# Patient Record
Sex: MALE | Race: WHITE | Employment: FULL TIME | URBAN - METROPOLITAN AREA
[De-identification: names, ages, dates, MRNs, and addresses within clinical notes are randomized per-mention and may not be internally consistent; named-entity substitution may affect disease eponyms.]

---

## 2017-12-10 DIAGNOSIS — F41.1 GENERALIZED ANXIETY DISORDER: ICD-10-CM

## 2017-12-10 DIAGNOSIS — H93.13 TINNITUS, BILATERAL: ICD-10-CM

## 2017-12-11 RX ORDER — SERTRALINE HYDROCHLORIDE 100 MG/1
TABLET, FILM COATED ORAL
Qty: 30 TABLET | Refills: 0 | Status: SHIPPED | OUTPATIENT
Start: 2017-12-11 | End: 2018-01-10

## 2017-12-11 NOTE — TELEPHONE ENCOUNTER
JCC, the patient is due for an office visit. I have changed the quantity to #30 and put in a note that the patient is due for an ov.    Pending Prescriptions:                       Disp   Refills    sertraline (ZOLOFT) 100 MG tablet [Pharma*30 tab*0            Sig: TAKE 1 TABLET (100 MG) BY MOUTH DAILY      Ready to be faxed when Rx is approved.    Please forward to the  so the can call the patient and help them set up an appointment.      Telephone Information:   Mobile [phone]         Thank-You,  Nila

## 2017-12-11 NOTE — TELEPHONE ENCOUNTER
Please let the patient know that a 30 day refill has been sent for their prescription.  They are due for a return non-fasting office visit within 30 days.  Failure to schedule an appointment may result in no further refills of his/her medication.

## 2018-01-10 ENCOUNTER — OFFICE VISIT (OUTPATIENT)
Dept: FAMILY MEDICINE | Facility: CLINIC | Age: 48
End: 2018-01-10

## 2018-01-10 VITALS
DIASTOLIC BLOOD PRESSURE: 86 MMHG | BODY MASS INDEX: 29.25 KG/M2 | WEIGHT: 202.4 LBS | SYSTOLIC BLOOD PRESSURE: 124 MMHG | TEMPERATURE: 97.7 F | OXYGEN SATURATION: 97 % | HEART RATE: 77 BPM

## 2018-01-10 DIAGNOSIS — F41.1 GAD (GENERALIZED ANXIETY DISORDER): Primary | ICD-10-CM

## 2018-01-10 DIAGNOSIS — H93.13 TINNITUS, BILATERAL: ICD-10-CM

## 2018-01-10 PROCEDURE — 99213 OFFICE O/P EST LOW 20 MIN: CPT | Performed by: FAMILY MEDICINE

## 2018-01-10 RX ORDER — SERTRALINE HYDROCHLORIDE 100 MG/1
100 TABLET, FILM COATED ORAL DAILY
Qty: 90 TABLET | Refills: 3 | Status: SHIPPED | OUTPATIENT
Start: 2018-01-10 | End: 2019-01-17

## 2018-01-10 NOTE — MR AVS SNAPSHOT
"              After Visit Summary   1/10/2018    George Nixon    MRN: [id number]           Patient Information     Date Of Birth          1970        Visit Information        Provider Department      1/10/2018 5:00 PM Zachary Nolasco MD [facility]        Today's Diagnoses     FLOYD (generalized anxiety disorder)    -  1    Tinnitus, bilateral           Follow-ups after your visit        Follow-up notes from your care team     Return in about 1 year (around 1/10/2019).      Who to contact     If you have questions or need follow up information about today's clinic visit or your schedule please contact [facility] directly at [phone].  Normal or non-critical lab and imaging results will be communicated to you by MyChart, letter or phone within 4 business days after the clinic has received the results. If you do not hear from us within 7 days, please contact the clinic through [employer] or phone. If you have a critical or abnormal lab result, we will notify you by phone as soon as possible.  Submit refill requests through [employer] or call your pharmacy and they will forward the refill request to us. Please allow 3 business days for your refill to be completed.          Additional Information About Your Visit        MyChart Information     [facility] lets you send messages to your doctor, view your test results, renew your prescriptions, schedule appointments and more. To sign up, go to [email or web address] . Click on \"Log in\" on the left side of the screen, which will take you to the Welcome page. Then click on \"Sign up Now\" on the right side of the page.     You will be asked to enter the access code listed below, as well as some personal information. Please follow the directions to create your username and password.     Your access code is: [email or web address]  Expires: 4/10/2018  5:13 PM     Your access code will  in 90 days. If you need help or a new " code, please call your [facility] clinic or [phone].        Care EveryWhere ID     This is your Care EveryWhere ID. This could be used by other organizations to access your [facility] medical records  [id number]        Your Vitals Were     Pulse Temperature Pulse Oximetry BMI (Body Mass Index)          77 97.7  F (36.5  C) (Oral) 97% 29.25 kg/m2         Blood Pressure from Last 3 Encounters:   01/10/18 124/86   11/21/16 110/72   04/11/16 120/76    Weight from Last 3 Encounters:   01/10/18 91.8 kg (202 lb 6.4 oz)   11/21/16 92.1 kg (203 lb)   04/11/16 86.2 kg (190 lb)              Today, you had the following     No orders found for display         Today's Medication Changes          These changes are accurate as of: 1/10/18  5:13 PM.  If you have any questions, ask your nurse or doctor.               These medicines have changed or have updated prescriptions.        Dose/Directions    sertraline 100 MG tablet   Commonly known as:  ZOLOFT   This may have changed:  See the new instructions.   Used for:  Tinnitus, bilateral, FLOYD (generalized anxiety disorder)   Changed by:  Zachary Nolasco MD        Dose:  100 mg   Take 1 tablet (100 mg) by mouth daily   Quantity:  90 tablet   Refills:  3            Where to get your medicines      These medications were sent to [facility]/pharmacy #[id number] [address] [address]     Phone:  [phone]     sertraline 100 MG tablet                Primary Care Provider Office Phone # Fax #    Zachary Nolasco [phone] [address] [address]        Equal Access to Services     [facility] : Hadii ilia rosas hadnayo Soenma, waaxda luqadaha, qaybta kaalmada robert, roney patel. So [facility] [phone].    ATENCIÓN: Si habla español, tiene a lee disposición servicios gratuitos de asistencia lingüística. Llame al [phone].    We comply  with applicable federal civil rights laws and [place] laws. We do not discriminate on the basis of race, color, national origin, age, disability, sex, sexual orientation, or gender identity.            Thank you!     Thank you for choosing [facility]  for your care. Our goal is always to provide you with excellent care. Hearing back from our patients is one way we can continue to improve our services. Please take a few minutes to complete the written survey that you may receive in the mail after your visit with us. Thank you!             Your Updated Medication List - Protect others around you: Learn how to safely use, store and throw away your medicines at www.disposemymeds.org.          This list is accurate as of: 1/10/18  5:13 PM.  Always use your most recent med list.                   Brand Name Dispense Instructions for use Diagnosis    LORazepam 0.5 MG tablet    ATIVAN    30 tablet    Take 1 tablet (0.5 mg) by mouth every 8 hours as needed for anxiety    Tinnitus, bilateral, Hyperacusis, unspecified laterality, Generalized anxiety disorder       sertraline 100 MG tablet    ZOLOFT    90 tablet    Take 1 tablet (100 mg) by mouth daily    Tinnitus, bilateral, FLOYD (generalized anxiety disorder)

## 2018-01-10 NOTE — NURSING NOTE
George is here for med check- medication is for ringing in ears not depression or anxiety    Pre-visit Screening:  Immunizations:  up to date  Colonoscopy:  NA  Mammogram: NA  Asthma Action Test/Plan:  NA  PHQ9:  NA  GAD7:  NA  Questioned patient about current smoking habits Pt. has never smoked.  Ok to leave detailed message on voice mail for today's visit only Yes, phone # [phone]

## 2018-01-10 NOTE — PROGRESS NOTES
SUBJECTIVE:                                                    George Nixon is a 47 year old male who presents to clinic today for the following health issues:      Anxiety Follow-Up    Status since last visit: No change    Other associated symptoms:None    Complicating factors:   Significant life event: No   Current substance abuse: None  Depression symptoms: No  FLOYD-7 SCORE 1/2/2014 4/13/2015 3/29/2016   Total Score 0 1 -   Total Score - - 10       GAD7              Amount of exercise or physical activity: 1 day/week for an average of 15-30 minutes    Problems taking medications regularly: No    Medication side effects: none    Diet: regular (no restrictions)        Tinnitus-still hears all the time but it waxes and wanes    Problem list and histories reviewed & adjusted, as indicated.  Additional history: as documented    Patient Active Problem List   Diagnosis     Generalized anxiety disorder     Increased bilirubin level     Mixed hyperlipidemia     Abnormal glucose     Hyperacusis     Health Care Home     Tinnitus     ACP (advance care planning)     FLOYD (generalized anxiety disorder)     Past Surgical History:   Procedure Laterality Date     C AFF ANUS PROCEDURE UNLISTED  2006    post anal cyst, dr Sainz     HC REPAIR OF NASAL SEPTUM  2001       Social History   Substance Use Topics     Smoking status: Never Smoker     Smokeless tobacco: Former User     Alcohol use 3.0 oz/week      Comment: occ      Family History   Problem Relation Age of Onset     GASTROINTESTINAL DISEASE Father      Hypertension Mother      Hypertension Sister      C.A.D. Father 45     C.A.D. Paternal Uncle          Current Outpatient Prescriptions   Medication Sig Dispense Refill     sertraline (ZOLOFT) 100 MG tablet TAKE 1 TABLET (100 MG) BY MOUTH DAILY 30 tablet 0     LORazepam (ATIVAN) 0.5 MG tablet Take 1 tablet (0.5 mg) by mouth every 8 hours as needed for anxiety 30 tablet 0     Allergies   Allergen Reactions     No Known  Allergies      Recent Labs   Lab Test  06/28/11   0500  06/28/11   0400 11/05/10   A1C   --    --   5.8   LDL   --    --   145*   HDL   --    --   47   TRIG   --    --   124   ALT   --    --   17   CR  0.77*   --   0.90   GFRESTIMATED  112   --   >59   GFRESTBLACK   --    --   >59   POTASSIUM  4.3   --   4.8   TSH   --   0.90   --       BP Readings from Last 3 Encounters:   01/10/18 124/86   11/21/16 110/72   04/11/16 120/76    Wt Readings from Last 3 Encounters:   01/10/18 91.8 kg (202 lb 6.4 oz)   11/21/16 92.1 kg (203 lb)   04/11/16 86.2 kg (190 lb)                          ROS:  Constitutional, HEENT, cardiovascular, pulmonary, gi and gu systems are negative, except as otherwise noted.      OBJECTIVE:   /86 (BP Location: Right arm, Patient Position: Chair, Cuff Size: Adult Large)  Pulse 77  Temp 97.7  F (36.5  C) (Oral)  Wt 91.8 kg (202 lb 6.4 oz)  SpO2 97%  BMI 29.25 kg/m2  Body mass index is 29.25 kg/(m^2).   GENERAL: healthy, alert and no distress  NECK: no adenopathy, no asymmetry, masses, or scars and thyroid normal to palpation  RESP: lungs clear to auscultation - no rales, rhonchi or wheezes  CV: regular rate and rhythm, normal S1 S2, no S3 or S4, no murmur, click or rub, no peripheral edema and peripheral pulses strong  ABDOMEN: soft, nontender, no hepatosplenomegaly, no masses and bowel sounds normal  MS: no gross musculoskeletal defects noted, no edema    Diagnostic Test Results:  none     ASSESSMENT:       PLAN:   (F41.1) FLOYD (generalized anxiety disorder)  (primary encounter diagnosis)  Comment: stable control-does not want to wean as that made tinnitus worse  Plan: sertraline (ZOLOFT) 100 MG tablet        continue current medications at current doses     (H93.13) Tinnitus, bilateral  Comment: stable  Plan: sertraline (ZOLOFT) 100 MG tablet        continue current medications at current doses       BMI:   Estimated body mass index is 29.25 kg/(m^2) as calculated from the following:     "Height as of 11/21/16: 1.772 m (5' 9.75\").    Weight as of this encounter: 91.8 kg (202 lb 6.4 oz).   Weight management plan: Discussed healthy diet and exercise guidelines and patient will follow up in 12 months in clinic to re-evaluate.        FUTURE APPOINTMENTS:       - Follow-up visit in 1 yr  Work on weight loss  Regular exercise    Zachary Nolasco MD  [facility]  "

## 2019-01-16 DIAGNOSIS — H93.13 TINNITUS, BILATERAL: ICD-10-CM

## 2019-01-16 DIAGNOSIS — F41.1 GAD (GENERALIZED ANXIETY DISORDER): ICD-10-CM

## 2019-01-16 RX ORDER — SERTRALINE HYDROCHLORIDE 100 MG/1
100 TABLET, FILM COATED ORAL DAILY
Qty: 90 TABLET | Refills: 1 | OUTPATIENT
Start: 2019-01-16

## 2019-01-16 NOTE — TELEPHONE ENCOUNTER
Refused Prescriptions:                       Disp   Refills    sertraline (ZOLOFT) 100 MG tablet [Pharmac*90 tab*1        Sig: TAKE 1 TABLET (100 MG) BY MOUTH DAILY  Refused By: KAYLA ERNST  Reason for Refusal: Request already responded to by other means (phone, fax, etc.)  Reason for Refusal Comment: refilled 1- for one year    Kayla  [phone] (home) [phone] (work)

## 2019-01-17 ENCOUNTER — TELEPHONE (OUTPATIENT)
Dept: FAMILY MEDICINE | Facility: CLINIC | Age: 49
End: 2019-01-17

## 2019-01-17 DIAGNOSIS — H93.13 TINNITUS, BILATERAL: ICD-10-CM

## 2019-01-17 DIAGNOSIS — F41.1 GAD (GENERALIZED ANXIETY DISORDER): ICD-10-CM

## 2019-01-17 RX ORDER — SERTRALINE HYDROCHLORIDE 100 MG/1
100 TABLET, FILM COATED ORAL DAILY
Qty: 90 TABLET | Refills: 1 | OUTPATIENT
Start: 2019-01-17

## 2019-01-17 RX ORDER — SERTRALINE HYDROCHLORIDE 100 MG/1
100 TABLET, FILM COATED ORAL DAILY
Qty: 6 TABLET | Refills: 0 | COMMUNITY
Start: 2019-01-17 | End: 2019-01-23

## 2019-01-17 NOTE — TELEPHONE ENCOUNTER
Refused Prescriptions:                       Disp   Refills    sertraline (ZOLOFT) 100 MG tablet [Pharmac*90 tab*1        Sig: TAKE 1 TABLET (100 MG) BY MOUTH DAILY  Refused By: KAYLA ERNST  Reason for Refusal: Duplicate  Reason for Refusal Comment: refilled 1- for one year    Kayla  [phone] (home) [phone] (work)

## 2019-01-17 NOTE — TELEPHONE ENCOUNTER
Pt made an appointment for 1/23. Called in 6 tablets with 0 refills to get him through until then.

## 2019-01-23 ENCOUNTER — OFFICE VISIT (OUTPATIENT)
Dept: FAMILY MEDICINE | Facility: CLINIC | Age: 49
End: 2019-01-23

## 2019-01-23 VITALS
HEART RATE: 69 BPM | WEIGHT: 209.4 LBS | DIASTOLIC BLOOD PRESSURE: 68 MMHG | HEIGHT: 70 IN | OXYGEN SATURATION: 94 % | TEMPERATURE: 97.6 F | BODY MASS INDEX: 29.98 KG/M2 | SYSTOLIC BLOOD PRESSURE: 110 MMHG

## 2019-01-23 DIAGNOSIS — F41.1 GAD (GENERALIZED ANXIETY DISORDER): ICD-10-CM

## 2019-01-23 DIAGNOSIS — H93.13 TINNITUS, BILATERAL: ICD-10-CM

## 2019-01-23 PROCEDURE — 99213 OFFICE O/P EST LOW 20 MIN: CPT | Performed by: FAMILY MEDICINE

## 2019-01-23 RX ORDER — SERTRALINE HYDROCHLORIDE 100 MG/1
100 TABLET, FILM COATED ORAL DAILY
Qty: 90 TABLET | Refills: 3 | Status: SHIPPED | OUTPATIENT
Start: 2019-01-23 | End: 2020-02-11

## 2019-01-23 ASSESSMENT — MIFFLIN-ST. JEOR: SCORE: 1817.11

## 2019-01-23 NOTE — PROGRESS NOTES
SUBJECTIVE:                                                    George Nixon is a 48 year old male who presents to clinic today for the following health issues:      Anxiety Pcvxod-Mv-kjqjxt related to chronic tinnitus    Status since last visit: No change    Other associated symptoms:None    Complicating factors:   Significant life event: No   Current substance abuse: None  Depression symptoms: No  FLOYD-7 SCORE 1/2/2014 4/13/2015 3/29/2016   Total Score 0 1 -   Total Score - - 10       FLOYD-7    Amount of exercise or physical activity: None    Problems taking medications regularly: No    Medication side effects: none    Diet: regular (no restrictions)            Problem list and histories reviewed & adjusted, as indicated.  Additional history: as documented    Patient Active Problem List   Diagnosis     Generalized anxiety disorder     Increased bilirubin level     Mixed hyperlipidemia     Abnormal glucose     Hyperacusis     Health Care Home     Tinnitus     ACP (advance care planning)     FLOYD (generalized anxiety disorder)     Past Surgical History:   Procedure Laterality Date     C AFF ANUS PROCEDURE UNLISTED  2006    post anal cyst, dr Sainz     HC REPAIR OF NASAL SEPTUM  2001       Social History     Tobacco Use     Smoking status: Never Smoker     Smokeless tobacco: Former User   Substance Use Topics     Alcohol use: Yes     Alcohol/week: 3.0 oz     Comment: occ      Family History   Problem Relation Age of Onset     Gastrointestinal Disease Father      Hypertension Mother      Hypertension Sister      C.A.D. Father 45     C.A.D. Paternal Uncle          Current Outpatient Medications   Medication Sig Dispense Refill     sertraline (ZOLOFT) 100 MG tablet Take 1 tablet (100 mg) by mouth daily 6 tablet 0     LORazepam (ATIVAN) 0.5 MG tablet Take 1 tablet (0.5 mg) by mouth every 8 hours as needed for anxiety 30 tablet 0     Allergies   Allergen Reactions     No Known Allergies      Recent Labs   Lab Test  "06/28/11  0500 06/28/11  0400   CR 0.77*  --    GFRESTIMATED 112  --    POTASSIUM 4.3  --    TSH  --  0.90      BP Readings from Last 3 Encounters:   01/23/19 110/68   01/10/18 124/86   11/21/16 110/72    Wt Readings from Last 3 Encounters:   01/23/19 95 kg (209 lb 6.4 oz)   01/10/18 91.8 kg (202 lb 6.4 oz)   11/21/16 92.1 kg (203 lb)                    ROS:  Constitutional, HEENT, cardiovascular, pulmonary, gi and gu systems are negative, except as otherwise noted.    OBJECTIVE:     /68 (BP Location: Right arm, Patient Position: Sitting, Cuff Size: Adult Large)   Pulse 69   Temp 97.6  F (36.4  C) (Oral)   Ht 1.772 m (5' 9.75\")   Wt 95 kg (209 lb 6.4 oz)   SpO2 94%   BMI 30.26 kg/m    Body mass index is 30.26 kg/m .   GENERAL: healthy, alert and no distress  EYES: Eyes grossly normal to inspection, PERRL and conjunctivae and sclerae normal  HENT: ear canals and TM's normal, nose and mouth without ulcers or lesions  NECK: no adenopathy, no asymmetry, masses, or scars and thyroid normal to palpation  RESP: lungs clear to auscultation - no rales, rhonchi or wheezes  CV: regular rate and rhythm, normal S1 S2, no S3 or S4, no murmur, click or rub, no peripheral edema and peripheral pulses strong  ABDOMEN: soft, nontender, no hepatosplenomegaly, no masses and bowel sounds normal  MS: no gross musculoskeletal defects noted, no edema  PSYCH: mentation appears normal, affect normal/bright    Diagnostic Test Results:  none     ASSESSMENT:       PLAN:   (H93.13) Tinnitus, bilateral  Comment: stable  Plan: sertraline (ZOLOFT) 100 MG tablet        continue current medications at current doses     (F41.1) FLOYD (generalized anxiety disorder)  Comment: well controlled  Plan: sertraline (ZOLOFT) 100 MG tablet        continue current medications at current doses       BMI:   Estimated body mass index is 30.26 kg/m  as calculated from the following:    Height as of this encounter: 1.772 m (5' 9.75\").    Weight as of this " encounter: 95 kg (209 lb 6.4 oz).   Weight management plan: Discussed healthy diet and exercise guidelines      FUTURE APPOINTMENTS:       - Follow-up visit in 1 yr  Work on weight loss  Regular exercise    Zachary Nolasco MD  [facility]

## 2019-01-23 NOTE — NURSING NOTE
George is here for med check (Yearly)    Pre-visit Screening:  Immunizations:  up to date declines flu shot  Colonoscopy:  NA  Mammogram: NA  Asthma Action Test/Plan:  NA  PHQ9:  Done today  GAD7:  Done today  Questioned patient about current smoking habits Pt. has never smoked.  Ok to leave detailed message on voice mail for today's visit only Yes, phone # [phone]

## 2019-11-12 RX ORDER — ALBUTEROL SULFATE 90 UG/1
AEROSOL, METERED RESPIRATORY (INHALATION)
Qty: 18 INHALER | Refills: 0 | OUTPATIENT
Start: 2019-11-12

## 2019-11-12 NOTE — TELEPHONE ENCOUNTER
Refused Prescriptions:                       Disp   Refills    VENTOLIN  (90 Base) MCG/ACT inhaler*18 Inh*0        Si-2 INHALATIONS EVERY 4-6 HOURS AS NEEDED FOR           WHEEZING. DISPENSE SPACER AS NEEDED.  Refused By: KAYLA ERNST  Reason for Refusal: OTHER    Not in pt chart  Last ov was  rtc in one year  No future appt's   Kayla  [phone] (home) [phone] (work)

## 2020-01-29 DIAGNOSIS — H93.13 TINNITUS, BILATERAL: ICD-10-CM

## 2020-01-29 DIAGNOSIS — F41.1 GAD (GENERALIZED ANXIETY DISORDER): ICD-10-CM

## 2020-01-29 RX ORDER — SERTRALINE HYDROCHLORIDE 100 MG/1
TABLET, FILM COATED ORAL
Qty: 90 TABLET | Refills: 3 | COMMUNITY
Start: 2020-01-29

## 2020-01-29 NOTE — TELEPHONE ENCOUNTER
George Nixon is requesting a refill of:    Refused Prescriptions:                       Disp   Refills    sertraline (ZOLOFT) 100 MG tablet [Pharmac*90 tab*3        Sig: TAKE 1 TABLET BY MOUTH EVERY DAY  Refused By: VEE TREJO  Reason for Refusal: Patient needs appointment    Pt needs non fasting OV

## 2020-02-11 DIAGNOSIS — H93.13 TINNITUS, BILATERAL: ICD-10-CM

## 2020-02-11 DIAGNOSIS — F41.1 GAD (GENERALIZED ANXIETY DISORDER): ICD-10-CM

## 2020-02-11 RX ORDER — SERTRALINE HYDROCHLORIDE 100 MG/1
TABLET, FILM COATED ORAL
Qty: 90 TABLET | Refills: 3 | COMMUNITY
Start: 2020-02-11

## 2020-02-11 RX ORDER — SERTRALINE HYDROCHLORIDE 100 MG/1
100 TABLET, FILM COATED ORAL DAILY
Qty: 8 TABLET | Refills: 0 | COMMUNITY
Start: 2020-02-11 | End: 2020-02-19

## 2020-02-11 NOTE — TELEPHONE ENCOUNTER
Received e-scribe request for pt's Sertraline. Pt wife also called wondering why he cannot get any refills. Left pt's wife a voicemail that he is due for OV and extension can be given once scheduled.    George Nixon is requesting a refill of:    Refused Prescriptions:                       Disp   Refills    sertraline (ZOLOFT) 100 MG tablet [Pharmac*90 tab*3        Sig: TAKE 1 TABLET BY MOUTH EVERY DAY  Refused By: AUDREY GONZALEZ  Reason for Refusal: Patient needs appointment

## 2020-02-11 NOTE — TELEPHONE ENCOUNTER
Pt scheduled appt for 02/19/20. Called him in 8 tablets of Sertraline 100MG to [facility].

## 2020-02-16 DIAGNOSIS — H93.13 TINNITUS, BILATERAL: ICD-10-CM

## 2020-02-16 DIAGNOSIS — F41.1 GAD (GENERALIZED ANXIETY DISORDER): ICD-10-CM

## 2020-02-17 RX ORDER — SERTRALINE HYDROCHLORIDE 100 MG/1
TABLET, FILM COATED ORAL
Qty: 8 TABLET | Refills: 0 | COMMUNITY
Start: 2020-02-17

## 2020-02-17 NOTE — TELEPHONE ENCOUNTER
George Nixon is requesting a refill of:    Refused Prescriptions:                       Disp   Refills    sertraline (ZOLOFT) 100 MG tablet [Pharmac*8 tabl*0        Sig: TAKE 1 TABLET BY MOUTH EVERY DAY  Refused By: VEE TREJO  Reason for Refusal: Patient needs appointment  Reason for Refusal Comment: Pt has OV for refills on 2/19/20

## 2020-02-19 ENCOUNTER — OFFICE VISIT (OUTPATIENT)
Dept: FAMILY MEDICINE | Facility: CLINIC | Age: 50
End: 2020-02-19

## 2020-02-19 VITALS
OXYGEN SATURATION: 97 % | HEIGHT: 70 IN | TEMPERATURE: 98.2 F | DIASTOLIC BLOOD PRESSURE: 72 MMHG | SYSTOLIC BLOOD PRESSURE: 114 MMHG | BODY MASS INDEX: 28.63 KG/M2 | WEIGHT: 200 LBS | HEART RATE: 71 BPM

## 2020-02-19 DIAGNOSIS — F41.1 GAD (GENERALIZED ANXIETY DISORDER): Primary | ICD-10-CM

## 2020-02-19 DIAGNOSIS — Z12.11 SPECIAL SCREENING FOR MALIGNANT NEOPLASMS, COLON: ICD-10-CM

## 2020-02-19 DIAGNOSIS — H93.13 TINNITUS, BILATERAL: ICD-10-CM

## 2020-02-19 PROCEDURE — 99213 OFFICE O/P EST LOW 20 MIN: CPT | Performed by: FAMILY MEDICINE

## 2020-02-19 RX ORDER — SERTRALINE HYDROCHLORIDE 100 MG/1
100 TABLET, FILM COATED ORAL DAILY
Qty: 90 TABLET | Refills: 3 | Status: SHIPPED | OUTPATIENT
Start: 2020-02-19 | End: 2021-02-26

## 2020-02-19 ASSESSMENT — MIFFLIN-ST. JEOR: SCORE: 1773.44

## 2020-02-19 NOTE — NURSING NOTE
George is here for a non fasting med check.          Pre-visit Screening:  Immunizations:  up to date  Colonoscopy:  is due and to be scheduled by patient for later completion  Mammogram: NA  Asthma Action Test/Plan:  NA  PHQ9:  None  GAD7:  None  Questioned patient about current smoking habits Pt. has never smoked.  Ok to leave detailed message on voice mail for today's visit only Yes, phone # [phone]

## 2020-02-19 NOTE — PROGRESS NOTES
Subjective     George Nixon is a 49 year old male who presents to clinic today for the following health issues:    HPI   Anxiety Zorpac-Db-fsmjijz mainly to chronic tinnitus    How are you doing with your anxiety since your last visit? No change    Are you having other symptoms that might be associated with anxiety? No    Have you had a significant life event? No     Are you feeling depressed? No    Do you have any concerns with your use of alcohol or other drugs? No    Social History     Tobacco Use     Smoking status: Never Smoker     Smokeless tobacco: Former User   Substance Use Topics     Alcohol use: Yes     Alcohol/week: 5.0 standard drinks     Comment: occ      Drug use: No     FLOYD-7 SCORE 1/2/2014 4/13/2015 3/29/2016   Total Score 0 1 -   Total Score - - 10     No flowsheet data found.  No flowsheet data found.  FLOYD-7  3/29/2016   1. Feeling nervous, anxious, or on edge 2   2. Not being able to stop or control worrying 1   3. Worrying too much about different things 1   4. Trouble relaxing 3   5. Being so restless that it is hard to sit still 3   6. Becoming easily annoyed or irritable 0   7. Feeling afraid, as if something awful might happen 0   FLOYD-7 Total Score 10   If you checked any problems, how difficult have they made it for you to do your work, take care of things at home, or get along with other people? Somewhat difficult         How many servings of fruits and vegetables do you eat daily?  2-3    On average, how many sweetened beverages do you drink each day (Examples: soda, juice, sweet tea, etc.  Do NOT count diet or artificially sweetened beverages)?   1    How many days per week do you exercise enough to make your heart beat faster? 5    How many minutes a day do you exercise enough to make your heart beat faster? 30 - 60    How many days per week do you miss taking your medication? 0        Patient Active Problem List   Diagnosis     Generalized anxiety disorder     Increased bilirubin  "level     Mixed hyperlipidemia     Abnormal glucose     Hyperacusis     Health Care Home     Tinnitus     ACP (advance care planning)     FLOYD (generalized anxiety disorder)     Past Surgical History:   Procedure Laterality Date     C AFF ANUS PROCEDURE UNLISTED  2006    post anal cyst, dr Sainz      REPAIR OF NASAL SEPTUM  2001       Social History     Tobacco Use     Smoking status: Never Smoker     Smokeless tobacco: Former User   Substance Use Topics     Alcohol use: Yes     Alcohol/week: 5.0 standard drinks     Comment: occ      Family History   Problem Relation Age of Onset     Gastrointestinal Disease Father      Hypertension Mother      Hypertension Sister      C.A.D. Father 45     C.A.D. Paternal Uncle          Current Outpatient Medications   Medication Sig Dispense Refill     sertraline (ZOLOFT) 100 MG tablet Take 1 tablet (100 mg) by mouth daily 8 tablet 0     Allergies   Allergen Reactions     No Known Allergies      No lab results found.   BP Readings from Last 3 Encounters:   02/19/20 114/72   01/23/19 110/68   01/10/18 124/86    Wt Readings from Last 3 Encounters:   02/19/20 90.7 kg (200 lb)   01/23/19 95 kg (209 lb 6.4 oz)   01/10/18 91.8 kg (202 lb 6.4 oz)                      Reviewed and updated as needed this visit by Provider         Review of Systems   ROS COMP: Constitutional, HEENT, cardiovascular, pulmonary, gi and gu systems are negative, except as otherwise noted.      Objective    /72 (BP Location: Left arm, Patient Position: Sitting, Cuff Size: Adult Large)   Pulse 71   Temp 98.2  F (36.8  C) (Temporal)   Ht 1.778 m (5' 10\")   Wt 90.7 kg (200 lb)   SpO2 97%   BMI 28.70 kg/m    Body mass index is 28.7 kg/m .  Physical Exam   GENERAL: healthy, alert and no distress  NECK: no adenopathy, no asymmetry, masses, or scars and thyroid normal to palpation  RESP: lungs clear to auscultation - no rales, rhonchi or wheezes  CV: regular rate and rhythm, normal S1 S2, no S3 or S4, no " "murmur, click or rub, no peripheral edema and peripheral pulses strong  ABDOMEN: soft, nontender, no hepatosplenomegaly, no masses and bowel sounds normal  MS: no gross musculoskeletal defects noted, no edema  PSYCH: mentation appears normal, affect normal/bright    Diagnostic Test Results:  Labs reviewed in Epic        Assessment & Plan   Assessment      Plan  (F41.1) FLOYD (generalized anxiety disorder)  (primary encounter diagnosis)  Comment: well controlled  Plan: sertraline 100 MG PO tablet        continue current medications at current doses     (H93.13) Tinnitus, bilateral  Comment: stable  Plan: sertraline 100 MG PO tablet            (Z12.11) Special screening for malignant neoplasms, colon  Comment:   Plan: GASTROENTEROLOGY ADULT REF PROCEDURE ONLY Other              BMI:   Estimated body mass index is 28.7 kg/m  as calculated from the following:    Height as of this encounter: 1.778 m (5' 10\").    Weight as of this encounter: 90.7 kg (200 lb).   Weight management plan: Discussed healthy diet and exercise guidelines        FUTURE APPOINTMENTS:       - Follow-up visit in 1 yr  Work on weight loss  Regular exercise    No follow-ups on file.    Zachary Nolasco MD  [facility]            "

## 2020-02-25 ENCOUNTER — TELEPHONE (OUTPATIENT)
Dept: FAMILY MEDICINE | Facility: CLINIC | Age: 50
End: 2020-02-25

## 2020-02-26 NOTE — TELEPHONE ENCOUNTER
George's wife Callie called stating they need confirmation on his sertraline and they think the dose was increased and would like to discuss a decrease possibly - Please call to discuss as he is really fatigued    Patient's phone #[phone]

## 2020-02-26 NOTE — TELEPHONE ENCOUNTER
I tried all numbers listed and all say they are not in service?  WE did not change dose so not sure what is going on    Can you try to reach someone

## 2020-09-30 ENCOUNTER — OFFICE VISIT (OUTPATIENT)
Dept: FAMILY MEDICINE | Facility: CLINIC | Age: 50
End: 2020-09-30

## 2020-09-30 VITALS
OXYGEN SATURATION: 97 % | BODY MASS INDEX: 29.9 KG/M2 | SYSTOLIC BLOOD PRESSURE: 122 MMHG | DIASTOLIC BLOOD PRESSURE: 88 MMHG | HEART RATE: 71 BPM | WEIGHT: 208.4 LBS | TEMPERATURE: 97.8 F

## 2020-09-30 DIAGNOSIS — J01.90 ACUTE SINUSITIS WITH SYMPTOMS > 10 DAYS: Primary | ICD-10-CM

## 2020-09-30 PROCEDURE — 99213 OFFICE O/P EST LOW 20 MIN: CPT | Performed by: FAMILY MEDICINE

## 2020-09-30 NOTE — NURSING NOTE
George is here for possible sinus infection    Pre-visit Screening:  Immunizations:  up to date  Colonoscopy:  Was scheduled in March and will re-schedule soon  Mammogram: PERFECTO  Asthma Action Test/Plan:  PREFECTO  PHQ9:  PERFECTO  GAD7:  NA  Questioned patient about current smoking habits Pt. has never smoked.  Ok to leave detailed message on voice mail for today's visit only Yes, phone # [phone]

## 2020-09-30 NOTE — PROGRESS NOTES
SUBJECTIVE:   George Nixon is a 50 year old male who complains of nasal congestion, headache, facial pressure, bilateral sinus pain and bilateral ear fullness for 21 days. He denies a history of productive cough, sweats, chills, myalgias, shortness of breath, nausea and vomiting and denies a history of asthma. Patient does not smoke cigarettes.    Pt has tried antihistamines, Nyquil, Tylenol-not helping    No hx allergies in Fall, no sneezing, itchy eyes    Patient Active Problem List   Diagnosis     Generalized anxiety disorder     Increased bilirubin level     Mixed hyperlipidemia     Abnormal glucose     Hyperacusis     Health Care Home     Tinnitus     ACP (advance care planning)     FLOYD (generalized anxiety disorder)     Past Medical History:   Diagnosis Date     Generalized anxiety disorder      Hearing loss      Hyperacusis 8/3/2011     Mixed hyperlipidemia 4/27/2011     Sleep problems     ringing in ears      Unspecified tinnitus 8/3/2011     Family History   Problem Relation Age of Onset     Gastrointestinal Disease Father      Hypertension Mother      Hypertension Sister      C.A.D. Father 45     C.A.D. Paternal Uncle      Social History     Socioeconomic History     Marital status:      Spouse name: Callie     Number of children: 2     Years of education: 13     Highest education level: Not on file   Occupational History     Occupation:      Employer: [employer]   Social Needs     Financial resource strain: Not on file     Food insecurity     Worry: Not on file     Inability: Not on file     Transportation needs     Medical: Not on file     Non-medical: Not on file   Tobacco Use     Smoking status: Never Smoker     Smokeless tobacco: Former User   Substance and Sexual Activity     Alcohol use: Yes     Alcohol/week: 5.0 standard drinks     Comment: occ      Drug use: No     Sexual activity: Yes     Partners: Female     Birth control/protection: Surgical     Comment: Tubal  ligation   Lifestyle     Physical activity     Days per week: Not on file     Minutes per session: Not on file     Stress: Not on file   Relationships     Social connections     Talks on phone: Not on file     Gets together: Not on file     Attends Cheondoism service: Not on file     Active member of club or organization: Not on file     Attends meetings of clubs or organizations: Not on file     Relationship status: Not on file     Intimate partner violence     Fear of current or ex partner: Not on file     Emotionally abused: Not on file     Physically abused: Not on file     Forced sexual activity: Not on file   Other Topics Concern      Service No     Blood Transfusions No     Caffeine Concern No     Occupational Exposure No     Hobby Hazards No     Sleep Concern No     Stress Concern No     Weight Concern No     Special Diet No     Back Care No     Exercise Yes     Bike Helmet Not Asked     Seat Belt Yes     Self-Exams Yes     Parent/sibling w/ CABG, MI or angioplasty before 65F 55M? Not Asked   Social History Narrative     Not on file     Past Surgical History:   Procedure Laterality Date     C AFF ANUS PROCEDURE UNLISTED  2006    post anal cyst, dr Sainz     HC REPAIR OF NASAL SEPTUM  2001     sertraline 100 MG PO tablet, Take 1 tablet (100 mg) by mouth daily    No current facility-administered medications on file prior to visit.        Allergies: No known allergies    Immunization History   Administered Date(s) Administered     Allergy Set 1 01/01/1997     TD (ADULT, 7+) 01/01/1995, 10/28/2005     TDAP Vaccine (Boostrix) 11/21/2012     Tdap (Adult) Unspecified Formulation 11/21/2012         OBJECTIVE:/88 (BP Location: Left arm, Patient Position: Sitting, Cuff Size: Adult Large)   Pulse 71   Temp 97.8  F (36.6  C) (Oral)   Wt 94.5 kg (208 lb 6.4 oz)   SpO2 97%   BMI 29.90 kg/m     He appears well, vital signs are as noted by the nurse. Ears normal.  Throat and pharynx normal.  Neck supple.  No adenopathy in the neck. Nose is congested. Sinuses  tender. The chest is clear, without wheezes or rales.    ASSESSMENT:   Sinusitis-no allergy symptoms , suspect infectious issue although minimal nasal discharge-recommend abx but if not better consider imaging etc    PLAN:augmentin  Symptomatic therapy suggested: push fluids and rest. Call or return to clinic prn if these symptoms worsen or fail to improve as anticipated.

## 2020-12-10 DIAGNOSIS — H93.13 TINNITUS, BILATERAL: ICD-10-CM

## 2020-12-10 DIAGNOSIS — F41.1 GAD (GENERALIZED ANXIETY DISORDER): ICD-10-CM

## 2020-12-10 RX ORDER — SERTRALINE HYDROCHLORIDE 100 MG/1
TABLET, FILM COATED ORAL
Qty: 90 TABLET | Refills: 1 | COMMUNITY
Start: 2020-12-10

## 2020-12-10 NOTE — TELEPHONE ENCOUNTER
Received incoming refill request for  Pending Prescriptions:                       Disp   Refills    sertraline (ZOLOFT) 100 MG tablet [Pharma*90 tab*1            Sig: TAKE 1 TABLET BY MOUTH EVERY DAY    Patient last had a refill of this medication on 2/19/2020 for a one year supply. It is to soon for this to be refilled as they should still have more refills.

## 2021-01-21 ENCOUNTER — OFFICE VISIT (OUTPATIENT)
Dept: FAMILY MEDICINE | Facility: CLINIC | Age: 51
End: 2021-01-21

## 2021-01-21 ENCOUNTER — HOSPITAL ENCOUNTER (EMERGENCY)
Facility: CLINIC | Age: 51
Discharge: HOME | End: 2021-01-21
Attending: EMERGENCY MEDICINE | Admitting: EMERGENCY MEDICINE
Payer: OTHER MISCELLANEOUS

## 2021-01-21 VITALS
DIASTOLIC BLOOD PRESSURE: 107 MMHG | WEIGHT: 200 LBS | TEMPERATURE: 97.8 F | HEART RATE: 62 BPM | RESPIRATION RATE: 18 BRPM | HEIGHT: 70 IN | OXYGEN SATURATION: 97 % | BODY MASS INDEX: 28.63 KG/M2 | SYSTOLIC BLOOD PRESSURE: 134 MMHG

## 2021-01-21 VITALS
BODY MASS INDEX: 29.98 KG/M2 | TEMPERATURE: 97.4 F | HEART RATE: 88 BPM | RESPIRATION RATE: 20 BRPM | HEIGHT: 70 IN | SYSTOLIC BLOOD PRESSURE: 138 MMHG | DIASTOLIC BLOOD PRESSURE: 90 MMHG | WEIGHT: 209.4 LBS

## 2021-01-21 DIAGNOSIS — S46.011D TRAUMATIC COMPLETE TEAR OF RIGHT ROTATOR CUFF, SUBSEQUENT ENCOUNTER: ICD-10-CM

## 2021-01-21 DIAGNOSIS — M54.81 OCCIPITAL NEURALGIA OF LEFT SIDE: ICD-10-CM

## 2021-01-21 DIAGNOSIS — Z01.818 PREOPERATIVE EXAMINATION: Primary | ICD-10-CM

## 2021-01-21 LAB
ERYTHROCYTE [DISTWIDTH] IN BLOOD BY AUTOMATED COUNT: 13.1 %
HCT VFR BLD AUTO: 45.5 % (ref 40–53)
HEMOGLOBIN: 15.8 G/DL (ref 13.3–17.7)
MCH RBC QN AUTO: 31.2 PG (ref 26–33)
MCHC RBC AUTO-ENTMCNC: 34.7 G/DL (ref 31–36)
MCV RBC AUTO: 89.8 FL (ref 78–100)
PLATELET COUNT - QUEST: 202 10^9/L (ref 150–375)
RBC # BLD AUTO: 5.07 10*12/L (ref 4.4–5.9)
WBC # BLD AUTO: 4.5 10*9/L (ref 4–11)

## 2021-01-21 PROCEDURE — 250N000013 HC RX MED GY IP 250 OP 250 PS 637: Performed by: EMERGENCY MEDICINE

## 2021-01-21 PROCEDURE — 250N000011 HC RX IP 250 OP 636: Performed by: EMERGENCY MEDICINE

## 2021-01-21 PROCEDURE — 99214 OFFICE O/P EST MOD 30 MIN: CPT | Performed by: FAMILY MEDICINE

## 2021-01-21 PROCEDURE — 36415 COLL VENOUS BLD VENIPUNCTURE: CPT | Performed by: FAMILY MEDICINE

## 2021-01-21 PROCEDURE — 20552 NJX 1/MLT TRIGGER POINT 1/2: CPT

## 2021-01-21 PROCEDURE — 85027 COMPLETE CBC AUTOMATED: CPT | Performed by: FAMILY MEDICINE

## 2021-01-21 PROCEDURE — 96372 THER/PROPH/DIAG INJ SC/IM: CPT | Performed by: EMERGENCY MEDICINE

## 2021-01-21 PROCEDURE — 99284 EMERGENCY DEPT VISIT MOD MDM: CPT | Mod: 25

## 2021-01-21 RX ORDER — CYCLOBENZAPRINE HCL 10 MG
10 TABLET ORAL ONCE
Status: COMPLETED | OUTPATIENT
Start: 2021-01-21 | End: 2021-01-21

## 2021-01-21 RX ORDER — CYCLOBENZAPRINE HCL 10 MG
10 TABLET ORAL 3 TIMES DAILY PRN
Qty: 20 TABLET | Refills: 0 | Status: SHIPPED | OUTPATIENT
Start: 2021-01-21 | End: 2021-01-27

## 2021-01-21 RX ORDER — KETOROLAC TROMETHAMINE 15 MG/ML
15 INJECTION, SOLUTION INTRAMUSCULAR; INTRAVENOUS ONCE
Status: COMPLETED | OUTPATIENT
Start: 2021-01-21 | End: 2021-01-21

## 2021-01-21 RX ORDER — HYDROCODONE BITARTRATE AND ACETAMINOPHEN 5; 325 MG/1; MG/1
1 TABLET ORAL EVERY 6 HOURS PRN
Qty: 10 TABLET | Refills: 0 | Status: SHIPPED | OUTPATIENT
Start: 2021-01-21 | End: 2021-01-24

## 2021-01-21 RX ADMIN — CYCLOBENZAPRINE 10 MG: 10 TABLET, FILM COATED ORAL at 03:55

## 2021-01-21 RX ADMIN — KETOROLAC TROMETHAMINE 15 MG: 15 INJECTION, SOLUTION INTRAMUSCULAR; INTRAVENOUS at 03:55

## 2021-01-21 ASSESSMENT — MIFFLIN-ST. JEOR
SCORE: 1811.08
SCORE: 1768.44

## 2021-01-21 ASSESSMENT — ENCOUNTER SYMPTOMS: NECK PAIN: 1

## 2021-01-21 NOTE — ED PROVIDER NOTES
"  History   Chief Complaint  Neck Pain    HPI   George Nixon is a 50 year old male who presents for evaluation of worsening neck pain that started three days ago. He describes the pain as a persistent throbbing behind his left ear that is exacerbated by laying down and moving his head. He has been icing and taking Ibuprofen/Tylenol with little relief in his discomfort. The patient last took Tylenol about 8 hours ago. He denies any pain radiating into his arms and has never had something like this.      Review of Systems   Musculoskeletal: Positive for neck pain.   All other systems reviewed and are negative.    Allergies  The patient has no known allergies.     Medications  Sertraline    Past Medical History  Anxiety  Hearing loss  Hyperlipidemia  Hyperacusis    Past Surgical History  Anal cyst surgery  Nasal septum surgery    Family History  Hypertension  GI disease  CAD    Social History  Presents to the ED alone.    Physical Exam     Patient Vitals for the past 24 hrs:   BP Temp Temp src Pulse Resp SpO2 Height Weight   01/21/21 0451 (!) 134/107 97.8  F (36.6  C) Oral 62 18 97 % -- --   01/21/21 0336 (!) 145/109 97.9  F (36.6  C) Oral 74 18 (!) 9 % 1.778 m (5' 10\") 90.7 kg (200 lb)     Physical Exam  Vitals: reviewed by me  General: Pt seen on [facility], [facility], cooperative, and alert to conversation  Eyes: Tracking well, clear conjunctiva BL  ENT: MMM, midline trachea.  No midline tenderness to neck, does have pain to the left occipital triangle in the high paracervical area.  Lungs:   No tachypnea, no accessory muscle use. No respiratory distress.   CV: Rate as above, regular rhythm.    Abd: Soft, non tender, no guarding, no rebound. Non distended  MSK: no peripheral edema or joint effusion.  No evidence of trauma  Skin: No rash, normal turgor and temperature  Neuro: Clear speech and no facial droop.  Bilateral upper extremities with sensation intact light touch and 5 out of 5 motor throughout.  No " paresthesias or shooting pains.  Psych: Not RIS, no e/o AH/VH      Emergency Department Course   Emergency Department Course:  Reviewed:  I reviewed nursing notes, vitals and past medical history    Assessments:  0345 I physically examined the patient as documented above.    0449 I rechecked the patient.     Interventions:  0355 Toradol 15 mg IM  0355 Flexeril 10 mg PO  0355 1% Lidocaine 4 ml's IM    Disposition:  The patient was discharged to home.     Impression & Plan   Medical Decision Making:  George Nixon is a very pleasant 50 year old male who presents to the Emergency Room with what appears to be occipital neuralgia. He has pain over the left occipital triangle and no midline tenderness. I do not think this is a slipped disc or infection. He does not look systemically unwell after several days of symptoms, but has focal pain in one area. He feels much improved after a muscle relaxer, shot of Toradol, and a trigger point injection with 1% lidocaine. He was given pain medication to go home with. He has no evidence of infection, recent trauma, and a normal neurological exam. He has no paresthesias that shoot down his arm and I do think that what he has is very annoying and painful, but does not appear to be life threatening. He feels comfortable with this plan and can follow safely with his regular doctor. I do think he is stable for close outpatient follow up.      Diagnosis:    ICD-10-CM    1. Occipital neuralgia of left side  M54.81      Discharge Medications:  Discharge Medication List as of 1/21/2021  4:52 AM      START taking these medications    Details   cyclobenzaprine (FLEXERIL) 10 MG tablet Take 1 tablet (10 mg) by mouth 3 times daily as needed for muscle spasms, Disp-20 tablet, R-0, Local Print      HYDROcodone-acetaminophen (NORCO) 5-325 MG tablet Take 1 tablet by mouth every 6 hours as needed, Disp-10 tablet, R-0, Local Print           Scribe Disclosure:  I, Raj Willett, am serving as a scribe  at 3:54 AM on 1/21/2021 to document services personally performed by Morris Rothman* based on my observations and the provider's statements to me.      Morris Rothman MD  01/21/21 0612

## 2021-01-21 NOTE — DISCHARGE INSTRUCTIONS
As we discussed, some of your pain may be due to tight muscles around the nerve and a special location of your neck called the occipital triangle. The medicine we gave you in the ER helped with this, please be sure to come back to the ER immediately if the medications that we have given you are not sufficient. Please try Tylenol and Motrin for your pain, and then take the Norco if your pain is not resolved, and take the Flexeril that we gave you to help with muscle tension. Do see your regular doctor as we discussed in the next 3 to 5 days.

## 2021-01-21 NOTE — NURSING NOTE
George Nixon is here for a pre-op exam.    Questioned patient about current smoking habits.  Pt. has never smoked.  PULSE regular  My Chart:   CLASSIFICATION OF OVERWEIGHT AND OBESITY BY BMI                        Obesity Class           BMI(kg/m2)  Underweight                                    < 18.5  Normal                                         18.5-24.9  Overweight                                     25.0-29.9  OBESITY                     I                  30.0-34.9                             II                 35.0-39.9  EXTREME OBESITY             III                >40                            Patient's  BMI Body mass index is 30.05 kg/m .  http://hin.nhlbi.nih.gov/menuplanner/menu.cgi  Pre-visit planning  Immunizations - up to date  Colonoscopy - needs to be scheduled  Mammogram -   Asthma -   PHQ9 -    FLOYD-7 -

## 2021-01-21 NOTE — PROGRESS NOTES
[facility]  [address] [place] [address]  [address]  [facility] [address]  Phone: [phone]  Fax: [phone]    1/21/2021    Adult PRE-OP Evaluation:    George RICHARD Nixon, 1970 presents for pre-operative evaluation and assessment as requested by Dr. Huerta, prior to undergoing surgery/procedure for treatment of  Right shoulder RC tear-WC   Proposed procedure: Right athroscopic rotator cuff    Date of Surgery/ Procedure: 1/28/21  Hospital/Surgical Facility: [facility]     Primary Physician: Zachary Nolasco  Type of Anesthesia Anticipated: to be determined  History of anesthesia complications: NONE  History of  abnormal bleeding: NONE   History of blood transfusions: NO  Patient has a Health Care Directive or Living Will:  NO    Preoperative Questions   1. No - Have you ever had a heart attack or stroke?  2. No - Have you ever had surgery on your heart or blood vessels, such as a stent, coronary (heart) bypass, or surgery on an artery in the head, neck, heart, or legs?  3. No - Do you have chest pain when you are physically active?  4. No - Do you have a history of heart failure?  5. No - Do you currently have a cold, bronchitis, or symptoms of other respiratory (head and chest) infections?  6. No - Do you have a cough, shortness of breath, or wheezing?  7. No - Do you or anyone in your family have a history of blood clots?  8. No - Do you or anyone in your family have a serious bleeding problem, such as long-lasting bleeding after surgeries or cuts?  9. No - Have you ever had anemia or been told to take iron pills?  10. No - Have you had any abnormal blood loss such as black, tarry or bloody stools, or abnormal vaginal bleeding?  11. No - Have you ever had a blood transfusion?  12. Yes - Are you willing to have a blood transfusion if it is medically needed before, during, or after your surgery?  13. No - Have you or anyone in your family ever had problems with anesthesia  (sedation for surgery)?  14. No - Do you have sleep apnea, excessive snoring, or daytime drowsiness?   15. No - Do you have any artifical heart valves or other implanted medical devices, such as a pacemaker, defibrillator, or continuous glucose monitor?  16. No - Do you have any artifical joints?  17. No - Are you allergic to latex?  18. No - Is there any chance that you may be pregnant?    Patient Active Problem List   Diagnosis     Generalized anxiety disorder     Increased bilirubin level     Mixed hyperlipidemia     Abnormal glucose     Hyperacusis     Health Care Home     Tinnitus     ACP (advance care planning)     FLOYD (generalized anxiety disorder)            sertraline 100 MG PO tablet, Take 1 tablet (100 mg) by mouth daily    No current facility-administered medications on file prior to visit.       OTC products: None, except as noted above    Allergies   Allergen Reactions     No Known Allergies      Latex Allergy: NO    Social History     Socioeconomic History     Marital status:      Spouse name: Callie     Number of children: 2     Years of education: 13     Highest education level: None   Occupational History     Occupation:      Employer: [employer]   Social Needs     Financial resource strain: None     Food insecurity     Worry: None     Inability: None     Transportation needs     Medical: None     Non-medical: None   Tobacco Use     Smoking status: Never Smoker     Smokeless tobacco: Former User   Substance and Sexual Activity     Alcohol use: Yes     Alcohol/week: 5.0 standard drinks     Comment: occ      Drug use: No     Sexual activity: Yes     Partners: Female     Birth control/protection: Surgical     Comment: Tubal ligation   Lifestyle     Physical activity     Days per week: None     Minutes per session: None     Stress: None   Relationships     Social connections     Talks on phone: None     Gets together: None     Attends Uatsdin service: None     Active  "member of club or organization: None     Attends meetings of clubs or organizations: None     Relationship status: None     Intimate partner violence     Fear of current or ex partner: None     Emotionally abused: None     Physically abused: None     Forced sexual activity: None   Other Topics Concern      Service No     Blood Transfusions No     Caffeine Concern No     Occupational Exposure No     Hobby Hazards No     Sleep Concern No     Stress Concern No     Weight Concern No     Special Diet No     Back Care No     Exercise Yes     Bike Helmet Not Asked     Seat Belt Yes     Self-Exams Yes     Parent/sibling w/ CABG, MI or angioplasty before 65F 55M? Not Asked   Social History Narrative     None       REVIEW OF SYSTEMS:   Constitutional, HEENT, cardiovascular, pulmonary, gi and gu systems are negative, except as otherwise noted.    EXAM:     Patient Vitals for the past 24 hrs:   Resp Height Weight   01/21/21 1405 20 1.778 m (5' 10\") 95 kg (209 lb 6.4 oz)     Body mass index is 30.05 kg/m .  GENERAL: healthy, alert and no distress  EYES: Eyes grossly normal to inspection, extraocular movements - intact, and PERRL  HENT: ear canals- normal; TMs- normal; Nose- normal; Mouth- no ulcers, no lesions  NECK: no tenderness, no adenopathy, no asymmetry, no masses, no stiffness; thyroid- normal to palpation  RESP: lungs clear to auscultation - no rales, no rhonchi, no wheezes  CV: regular rates and rhythm, normal S1 S2, no S3 or S4 and no murmur, no click or rub -  ABDOMEN: soft, no tenderness, no  hepatosplenomegaly, no masses, normal bowel sounds  PSYCH: Alert and oriented times 3; speech- coherent , normal rate and volume; able to articulate logical thoughts  LYMPHATICS: ant. cervical- normal, post. cervical- normal    DIAGNOSTICS:      EKG: Not indicated due to non-vascular surgery and low risk of event (age <65 and without cardiac risk factors)  Labs Resulted Today:   Results for orders placed or performed in " visit on 01/21/21   Hemogram Platelet (BFP)     Status: None   Result Value Ref Range    WBC 4.5 4.0 - 11 10*9/L    RBC Count 5.07 4.4 - 5.9 10*12/L    Hemoglobin 15.8 13.3 - 17.7 g/dL    Hematocrit 45.5 40.0 - 53.0 %    MCV 89.8 78 - 100 fL    MCH 31.2 26 - 33 pg    MCHC 34.7 31 - 36 g/dL    RDW 13.1 %    Platelet Count 202 150 - 375 10^9/L       RISK ASSESSMENT:     Cardiovascular Risk:  -Patient is able to participate in strenuous activities without chest pain.  -The patient does not have chest pain with exertion.  -Patient does not have a history of congestive heart failure.    -The patient does not have a history of stroke and does not have a history of valvular disease.    Pulmonary Risk:  -In terms of risk factors for pulmonary complication, the patient has no risk factors    Perioperative Complications:  -The patient does not have a history of bleeding or clotting problems in the past.    -The patient has not had complications from surgeries.    -The patient does not have a family history of any anesthesia or surgical complications.      IMPRESSION:   Reason for surgery/procedure: right rotator cuff tear, work comp    The proposed surgical procedure is considered INTERMEDIATE risk.    For above listed surgery and anesthesia:   Patient is at low risk for surgery/procedure and perioperative/procedure complications.    RECOMMENDATIONS:     Labs:  Hgb wnl    Fasting:  Per surgeon    Preop Plan:      Medications:  Patient should take their regular medications the morning of surgery unless otherwise instructed.      KAZ Nolasco M.D.   Please contact our office if there are any further questions or information required about this patient.

## 2021-01-28 ENCOUNTER — TELEPHONE (OUTPATIENT)
Dept: FAMILY MEDICINE | Facility: CLINIC | Age: 51
End: 2021-01-28

## 2021-01-28 DIAGNOSIS — R06.81 APNEA: Primary | ICD-10-CM

## 2021-01-28 NOTE — TELEPHONE ENCOUNTER
George called stating that he had low oxygen levels before shoulder surgery today. Oxygen was administered during surgery because of that.  He was asked if he's been diagnosed with sleep apnea. George is requesting a referral for a sleep study.  Pt was seen by Dr. Nolasco for a pre-op on 1/21/2021.    Please advise, thanks.    George's phone # [phone]

## 2021-02-26 ENCOUNTER — TELEPHONE (OUTPATIENT)
Dept: FAMILY MEDICINE | Facility: CLINIC | Age: 51
End: 2021-02-26

## 2021-02-26 DIAGNOSIS — F41.1 GAD (GENERALIZED ANXIETY DISORDER): ICD-10-CM

## 2021-02-26 DIAGNOSIS — H93.13 TINNITUS, BILATERAL: ICD-10-CM

## 2021-02-26 RX ORDER — SERTRALINE HYDROCHLORIDE 100 MG/1
100 TABLET, FILM COATED ORAL DAILY
Qty: 10 TABLET | Refills: 0 | Status: SHIPPED | OUTPATIENT
Start: 2021-02-26 | End: 2021-03-22

## 2021-02-26 NOTE — TELEPHONE ENCOUNTER
Patients wife called in and left a message stating that the patient [place] and that is why they are requesting a refill from this pharmacy.

## 2021-02-26 NOTE — TELEPHONE ENCOUNTER
Shanika from [facility] called asking for a short supply of prescription sent in. Pt is on vacation and left prescriptions at home. He is due for OV are you willing to send in short supply?    George Nixon is requesting a refill of:    Pending Prescriptions:                       Disp   Refills    sertraline (ZOLOFT) 100 MG tablet         10 tab*0            Sig: Take 1 tablet (100 mg) by mouth daily

## 2021-03-03 ENCOUNTER — TRANSFERRED RECORDS (OUTPATIENT)
Dept: FAMILY MEDICINE | Facility: CLINIC | Age: 51
End: 2021-03-03

## 2021-03-22 ENCOUNTER — TELEPHONE (OUTPATIENT)
Dept: FAMILY MEDICINE | Facility: CLINIC | Age: 51
End: 2021-03-22

## 2021-03-22 DIAGNOSIS — H93.13 TINNITUS, BILATERAL: ICD-10-CM

## 2021-03-22 DIAGNOSIS — F41.1 GAD (GENERALIZED ANXIETY DISORDER): ICD-10-CM

## 2021-03-22 RX ORDER — SERTRALINE HYDROCHLORIDE 100 MG/1
100 TABLET, FILM COATED ORAL DAILY
Qty: 5 TABLET | Refills: 0 | COMMUNITY
Start: 2021-03-22 | End: 2021-03-29

## 2021-03-22 NOTE — TELEPHONE ENCOUNTER
Pt has OV on 3/29/21. Will be out of his sertraline. Called in to [facility]    George Nixon is requesting a refill of:    Signed Prescriptions:                        Disp   Refills    sertraline (ZOLOFT) 100 MG tablet          5 tabl*0        Sig: Take 1 tablet (100 mg) by mouth daily  Authorizing Provider: NORMA BOATENG  Ordering User: VEE TREJO

## 2021-03-29 ENCOUNTER — OFFICE VISIT (OUTPATIENT)
Dept: FAMILY MEDICINE | Facility: CLINIC | Age: 51
End: 2021-03-29

## 2021-03-29 VITALS
HEART RATE: 80 BPM | RESPIRATION RATE: 20 BRPM | BODY MASS INDEX: 30.55 KG/M2 | DIASTOLIC BLOOD PRESSURE: 92 MMHG | TEMPERATURE: 98.6 F | HEIGHT: 70 IN | SYSTOLIC BLOOD PRESSURE: 136 MMHG | WEIGHT: 213.4 LBS

## 2021-03-29 DIAGNOSIS — F41.1 GAD (GENERALIZED ANXIETY DISORDER): ICD-10-CM

## 2021-03-29 DIAGNOSIS — H93.13 TINNITUS, BILATERAL: ICD-10-CM

## 2021-03-29 PROCEDURE — 99213 OFFICE O/P EST LOW 20 MIN: CPT | Performed by: FAMILY MEDICINE

## 2021-03-29 RX ORDER — SERTRALINE HYDROCHLORIDE 100 MG/1
100 TABLET, FILM COATED ORAL DAILY
Qty: 90 TABLET | Refills: 3 | Status: SHIPPED | OUTPATIENT
Start: 2021-03-29

## 2021-03-29 ASSESSMENT — ANXIETY QUESTIONNAIRES
3. WORRYING TOO MUCH ABOUT DIFFERENT THINGS: NOT AT ALL
5. BEING SO RESTLESS THAT IT IS HARD TO SIT STILL: NOT AT ALL
7. FEELING AFRAID AS IF SOMETHING AWFUL MIGHT HAPPEN: NOT AT ALL
GAD7 TOTAL SCORE: 0
2. NOT BEING ABLE TO STOP OR CONTROL WORRYING: NOT AT ALL
1. FEELING NERVOUS, ANXIOUS, OR ON EDGE: NOT AT ALL
6. BECOMING EASILY ANNOYED OR IRRITABLE: NOT AT ALL

## 2021-03-29 ASSESSMENT — MIFFLIN-ST. JEOR: SCORE: 1829.23

## 2021-03-29 ASSESSMENT — PATIENT HEALTH QUESTIONNAIRE - PHQ9: 5. POOR APPETITE OR OVEREATING: NOT AT ALL

## 2021-03-29 NOTE — PROGRESS NOTES
"    Assessment & Plan     FLOYD (generalized anxiety disorder)  stable symptomatically continue current medications at current doses   - sertraline (ZOLOFT) 100 MG tablet  Dispense: 90 tablet; Refill: 3    Tinnitus, bilateral  stable symptomatically , continue current medications at current doses   - sertraline (ZOLOFT) 100 MG tablet  Dispense: 90 tablet; Refill: 3               BMI:   Estimated body mass index is 30.62 kg/m  as calculated from the following:    Height as of this encounter: 1.778 m (5' 10\").    Weight as of this encounter: 96.8 kg (213 lb 6.4 oz).           No follow-ups on file.    Zachary Nolasco MD  [facility]    Subjective   George is a 50 year old who presents for the following health issues     HPI     Anxiety Follow-Up-tinnitus issues affect anxiety    How are you doing with your anxiety since your last visit? No change    Are you having other symptoms that might be associated with anxiety? No    Have you had a significant life event? OTHER: shoulder surgery     Are you feeling depressed? No    Do you have any concerns with your use of alcohol or other drugs? No    Social History     Tobacco Use     Smoking status: Never Smoker     Smokeless tobacco: Former User   Substance Use Topics     Alcohol use: Yes     Alcohol/week: 5.0 standard drinks     Comment: occ      Drug use: No     FLOYD-7 SCORE 1/2/2014 4/13/2015 3/29/2016   Total Score 0 1 -   Total Score - - 10     No flowsheet data found.  See screeners      How many servings of fruits and vegetables do you eat daily?  2-3    On average, how many sweetened beverages do you drink each day (Examples: soda, juice, sweet tea, etc.  Do NOT count diet or artificially sweetened beverages)?   1    How many days per week do you exercise enough to make your heart beat faster? 3 or less    How many minutes a day do you exercise enough to make your heart beat faster? 20 - 29    How many days per week do you miss taking your " "medication? 0        Review of Systems   Constitutional, HEENT, cardiovascular, pulmonary, gi and gu systems are negative, except as otherwise noted.      Objective    BP (!) 136/92 (BP Location: Left arm, Patient Position: Chair, Cuff Size: Adult Large)   Pulse 80   Temp 98.6  F (37  C)   Resp 20   Ht 1.778 m (5' 10\")   Wt 96.8 kg (213 lb 6.4 oz)   BMI 30.62 kg/m    Body mass index is 30.62 kg/m .  Physical Exam   GENERAL: healthy, alert and no distress  NECK: no adenopathy, no asymmetry, masses, or scars and thyroid normal to palpation  RESP: lungs clear to auscultation - no rales, rhonchi or wheezes  CV: regular rate and rhythm, normal S1 S2, no S3 or S4, no murmur, click or rub, no peripheral edema and peripheral pulses strong  ABDOMEN: soft, nontender, no hepatosplenomegaly, no masses and bowel sounds normal  MS: no gross musculoskeletal defects noted, no edema  PSYCH: mentation appears normal, affect normal/bright                 "

## 2021-03-29 NOTE — NURSING NOTE
George Nixon is here for a medication check and refill.  Questioned patient about current smoking habits.  Pt. has never smoked.  PULSE regular  My Chart:   CLASSIFICATION OF OVERWEIGHT AND OBESITY BY BMI                        Obesity Class           BMI(kg/m2)  Underweight                                    < 18.5  Normal                                         18.5-24.9  Overweight                                     25.0-29.9  OBESITY                     I                  30.0-34.9                             II                 35.0-39.9  EXTREME OBESITY             III                >40                            Patient's  BMI Body mass index is 30.62 kg/m .  [email or web address]  Pre-visit planning  Immunizations - up to date  Colonoscopy - is up to date  Mammogram -   Asthma -   PHQ9 -    FLOYD-7 -    Hearing Test -

## 2021-03-30 ASSESSMENT — ANXIETY QUESTIONNAIRES: GAD7 TOTAL SCORE: 0

## 2022-05-04 DIAGNOSIS — F41.1 GAD (GENERALIZED ANXIETY DISORDER): ICD-10-CM

## 2022-05-04 DIAGNOSIS — H93.13 TINNITUS, BILATERAL: ICD-10-CM

## 2022-05-04 RX ORDER — SERTRALINE HYDROCHLORIDE 100 MG/1
TABLET, FILM COATED ORAL
Qty: 90 TABLET | Refills: 1 | COMMUNITY
Start: 2022-05-04

## 2022-05-04 NOTE — TELEPHONE ENCOUNTER
George Nixon is requesting a refill of:    Refused Prescriptions:                       Disp   Refills    sertraline (ZOLOFT) 100 MG tablet [Pharmac*90 tab*1        Sig: TAKE 1 TABLET BY MOUTH EVERY DAY  Refused By: AUDREY GONZALEZ  Reason for Refusal: Patient needs appointment    Pt last seen 03/29/21 due for yearly OV